# Patient Record
Sex: FEMALE | Race: WHITE
[De-identification: names, ages, dates, MRNs, and addresses within clinical notes are randomized per-mention and may not be internally consistent; named-entity substitution may affect disease eponyms.]

---

## 2022-01-10 ENCOUNTER — HOSPITAL ENCOUNTER (OUTPATIENT)
Dept: HOSPITAL 4 - SCA | Age: 62
Discharge: HOME | End: 2022-01-10
Attending: INTERNAL MEDICINE
Payer: COMMERCIAL

## 2022-01-10 DIAGNOSIS — I51.7: ICD-10-CM

## 2022-01-10 DIAGNOSIS — R01.1: ICD-10-CM

## 2022-01-10 DIAGNOSIS — R93.1: ICD-10-CM

## 2022-01-10 DIAGNOSIS — I34.0: Primary | ICD-10-CM

## 2022-07-16 ENCOUNTER — HOSPITAL ENCOUNTER (EMERGENCY)
Dept: HOSPITAL 4 - SED | Age: 62
Discharge: HOME | End: 2022-07-16
Payer: COMMERCIAL

## 2022-07-16 VITALS — HEIGHT: 67 IN | WEIGHT: 240 LBS | BODY MASS INDEX: 37.67 KG/M2

## 2022-07-16 VITALS — SYSTOLIC BLOOD PRESSURE: 147 MMHG

## 2022-07-16 DIAGNOSIS — L57.8: ICD-10-CM

## 2022-07-16 DIAGNOSIS — R22.0: Primary | ICD-10-CM

## 2022-07-16 DIAGNOSIS — L01.00: ICD-10-CM

## 2022-07-16 PROCEDURE — 70486 CT MAXILLOFACIAL W/O DYE: CPT

## 2022-07-16 PROCEDURE — 96372 THER/PROPH/DIAG INJ SC/IM: CPT

## 2022-07-16 PROCEDURE — 76376 3D RENDER W/INTRP POSTPROCES: CPT

## 2022-07-16 PROCEDURE — 99284 EMERGENCY DEPT VISIT MOD MDM: CPT

## 2022-07-16 NOTE — NUR
Patient A/Ox4, VSS, resp even and unlabored, no resp distress noted, 
ambulatory. Patient presents with swelling noted to the top lip secondary to 
possible allergic reaction. Patient states "I was in the sun all day by the 
water when all of a sudden I noticed my top lip was swollen." Nad noted at this 
time. Will continue to monitor patient.

## 2022-07-16 NOTE — NUR
Patient given written and verbal discharge instructions and verbalizes 
understanding.  ER MD discussed with patient the results and treatment 
provided. Patient in stable condition. ID arm band removed. 

Rx of Prednisone  given. Patient educated on pain management and to follow up 
with PMD. Pain Scale 0/10

Opportunity for questions provided and answered. Medication side effect fact 
sheet provided.

## 2022-07-16 NOTE — NUR
Patient resting comfortably with  at bedside.  No acute distress noted.  
Vital signs within normal range. Will continue to monitor.

## 2024-08-07 ENCOUNTER — HOSPITAL ENCOUNTER (EMERGENCY)
Dept: HOSPITAL 4 - SED | Age: 64
Discharge: HOME | End: 2024-08-07
Payer: COMMERCIAL

## 2024-08-07 VITALS
HEART RATE: 99 BPM | RESPIRATION RATE: 22 BRPM | OXYGEN SATURATION: 98 % | TEMPERATURE: 98.3 F | SYSTOLIC BLOOD PRESSURE: 152 MMHG

## 2024-08-07 VITALS — WEIGHT: 210 LBS | HEIGHT: 67 IN | BODY MASS INDEX: 32.96 KG/M2

## 2024-08-07 DIAGNOSIS — Z79.899: ICD-10-CM

## 2024-08-07 DIAGNOSIS — H60.93: Primary | ICD-10-CM
